# Patient Record
Sex: FEMALE | Race: WHITE | NOT HISPANIC OR LATINO
[De-identification: names, ages, dates, MRNs, and addresses within clinical notes are randomized per-mention and may not be internally consistent; named-entity substitution may affect disease eponyms.]

---

## 2018-08-02 PROBLEM — Z00.00 ENCOUNTER FOR PREVENTIVE HEALTH EXAMINATION: Status: ACTIVE | Noted: 2018-08-02

## 2019-07-14 ENCOUNTER — TRANSCRIPTION ENCOUNTER (OUTPATIENT)
Age: 34
End: 2019-07-14

## 2019-11-30 ENCOUNTER — TRANSCRIPTION ENCOUNTER (OUTPATIENT)
Age: 34
End: 2019-11-30

## 2020-07-22 ENCOUNTER — TRANSCRIPTION ENCOUNTER (OUTPATIENT)
Age: 35
End: 2020-07-22

## 2021-09-11 ENCOUNTER — TRANSCRIPTION ENCOUNTER (OUTPATIENT)
Age: 36
End: 2021-09-11

## 2021-09-15 ENCOUNTER — EMERGENCY (EMERGENCY)
Facility: HOSPITAL | Age: 36
LOS: 0 days | Discharge: HOME | End: 2021-09-15
Payer: MEDICAID

## 2021-09-15 VITALS
RESPIRATION RATE: 20 BRPM | DIASTOLIC BLOOD PRESSURE: 70 MMHG | HEART RATE: 76 BPM | OXYGEN SATURATION: 99 % | TEMPERATURE: 98 F | SYSTOLIC BLOOD PRESSURE: 125 MMHG

## 2021-09-15 VITALS
HEART RATE: 86 BPM | TEMPERATURE: 98 F | OXYGEN SATURATION: 100 % | DIASTOLIC BLOOD PRESSURE: 67 MMHG | WEIGHT: 154.1 LBS | SYSTOLIC BLOOD PRESSURE: 109 MMHG | RESPIRATION RATE: 17 BRPM | HEIGHT: 61 IN
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
## 2021-09-15 DIAGNOSIS — S52.501A UNSPECIFIED FRACTURE OF THE LOWER END OF RIGHT RADIUS, INITIAL ENCOUNTER FOR CLOSED FRACTURE: ICD-10-CM

## 2021-09-15 DIAGNOSIS — S69.91XA UNSPECIFIED INJURY OF RIGHT WRIST, HAND AND FINGER(S), INITIAL ENCOUNTER: ICD-10-CM

## 2021-09-15 PROCEDURE — 99284 EMERGENCY DEPT VISIT MOD MDM: CPT

## 2021-09-15 PROCEDURE — 73130 X-RAY EXAM OF HAND: CPT | Mod: 26,RT

## 2021-09-15 PROCEDURE — 73090 X-RAY EXAM OF FOREARM: CPT | Mod: 26,RT

## 2021-09-15 PROCEDURE — 73110 X-RAY EXAM OF WRIST: CPT | Mod: 26,RT

## 2021-09-15 RX ORDER — KETOROLAC TROMETHAMINE 30 MG/ML
30 SYRINGE (ML) INJECTION ONCE
Refills: 0 | Status: DISCONTINUED | OUTPATIENT
Start: 2021-09-15 | End: 2021-09-15

## 2021-09-15 RX ORDER — OXYCODONE AND ACETAMINOPHEN 5; 325 MG/1; MG/1
1 TABLET ORAL ONCE
Refills: 0 | Status: DISCONTINUED | OUTPATIENT
Start: 2021-09-15 | End: 2021-09-15

## 2021-09-15 RX ADMIN — Medication 30 MILLIGRAM(S): at 20:47

## 2021-09-15 RX ADMIN — OXYCODONE AND ACETAMINOPHEN 1 TABLET(S): 5; 325 TABLET ORAL at 22:12

## 2021-09-15 RX ADMIN — Medication 30 MILLIGRAM(S): at 21:17

## 2021-09-15 RX ADMIN — OXYCODONE AND ACETAMINOPHEN 1 TABLET(S): 5; 325 TABLET ORAL at 22:13

## 2021-09-15 NOTE — CONSULT NOTE ADULT - SUBJECTIVE AND OBJECTIVE BOX
35yFemale presents to ED c/o severe R wrist pain s/p mechanical fall. Patient denies head hit or LOC. Localizing pain to wrist. Denies radiation of pain. Pt denies numbness, tingling or burning. R Hand Dominant. Patient denies any other injuries.    PAST MEDICAL & SURGICAL HISTORY:  No pertinent past medical history    No significant past surgical history      Home Medications:    Allergies    No Known Allergies    Intolerances        PE   Gen: NAD, resting comfortably  RUE:  Skin intact  +TTP over DR  Limited ROM of wrist d/t pain  +AIN/PIN/Ulnar/Musc/Median  Radial pulse 2+  Compartments soft and compressible    Secondary Survey  LUE: Skin intact, no bony tenderness or deformity  RLE: Skin intact, no bony tenderness or deformity  LLE: Skin intact, no bony tenderness or deformity  Spine: Skin intact, no bony tenderness or stepoffs    Imaging:  XRay R Wrist: Showing distal radius fracture    Procedure Note:  After verbal consent obtained, 10 cc of 1% Lidocaine injected into area around fracture site as hematoma block. Sugartong splint applied to Forearm/Wrist and mold held. Post-reduction Xrays obtained which show improved alignment of DR Fracture. Pt NVI post procedure. Pt tolerated procedure well.    A/P: 35yFemale s/p Mech Fall w/ R Distal Radius Fracture  - Pain control  - Strict Ice/Elevation  - NWB RUE with splint and sling  - Keep splint clean/dry/intact;  - Encourage active finger motion to help with swelling  - Pt aware of possible need for surgical intervention of distal radius fracture. Will FU as outpatient  - Pt made aware of signs and symptoms of compartment syndrome. Aware of need to contact Doctor / Return to ED if symtoms arise  - All questions answered, Pt/family understand plan.  - FU w/ Dr. Martinez in 2-3 days.

## 2021-09-15 NOTE — ED PROVIDER NOTE - CARE PROVIDER_API CALL
Lamin Martinez (DO)  Orthopaedic Surgery  [address] [place] [address]  Phone: [phone]  Fax: [phone]  Follow Up Time:

## 2021-09-15 NOTE — ED ADULT NURSE NOTE - OBJECTIVE STATEMENT
Pt alert and oriented to ed with c/o right arm pain, rom urgent care s/p close fracture of the right wrist .Sling on.

## 2021-09-15 NOTE — ED PROVIDER NOTE - PATIENT PORTAL LINK FT
You can access the FollowMyHealth Patient Portal offered by [facility] by registering at the following website: [email or web address]. By joining [facility]’s FollowMyHealth portal, you will also be able to view your health information using other applications (apps) compatible with our system.

## 2021-09-15 NOTE — ED PROVIDER NOTE - CLINICAL SUMMARY MEDICAL DECISION MAKING FREE TEXT BOX
distal radius fracture, neurovascularly intact, recommend repeat xrays, analgesia, orthopedics consult

## 2021-09-15 NOTE — ED ADULT NURSE NOTE - PRO INTERPRETER NEED 2
English 34 year old female P1 at 40.6 weeks in labor    GBS negative  EFW 3300g    case d/w Dr Dias   admitted in labor   epidural prn   covid 19 testing   Yaya BRANNON

## 2021-09-15 NOTE — ED PROVIDER NOTE - OBJECTIVE STATEMENT
35F here with right wrist injury. Sent from [place] [facility] with fracture. Reports this morning she was sparring with her , fell to the ground and developed severe wrist pain. No numbness or weakness. No other injury. Denies pregnancy

## 2021-09-20 ENCOUNTER — APPOINTMENT (OUTPATIENT)
Dept: ORTHOPEDIC SURGERY | Facility: CLINIC | Age: 36
End: 2021-09-20
Payer: MEDICAID

## 2021-09-20 DIAGNOSIS — S52.501A UNSPECIFIED FRACTURE OF THE LOWER END OF RIGHT RADIUS, INITIAL ENCOUNTER FOR CLOSED FRACTURE: ICD-10-CM

## 2021-09-20 PROCEDURE — 29075 APPL CST ELBW FNGR SHORT ARM: CPT | Mod: RT

## 2021-09-20 PROCEDURE — 99203 OFFICE O/P NEW LOW 30 MIN: CPT | Mod: 25

## 2021-09-20 PROCEDURE — 73110 X-RAY EXAM OF WRIST: CPT | Mod: RT

## 2021-09-20 RX ORDER — IBUPROFEN 600 MG/1
600 TABLET, FILM COATED ORAL
Qty: 60 | Refills: 0 | Status: ACTIVE | COMMUNITY
Start: 2021-09-20 | End: 1900-01-01

## 2021-09-20 NOTE — ADDENDUM
[FreeTextEntry1] : I, Ninoska Morales wrote this note acting as a scribe for Dr. Guero Jeong on Sep 20, 2021.

## 2021-09-20 NOTE — RETURN TO WORK/SCHOOL
[FreeTextEntry1] : Ms. ERICKSON DO was seen in the office today on 09/20/2021 and evaluated by me for an Orthopedic visit. Please be advised that she will return to work on Monday 09/27/21.\par \par Sincerely,\par \par Dr. Guero Argueta M.D. on 09/20/2021.

## 2021-09-20 NOTE — END OF VISIT
[FreeTextEntry3] : All medical record entries made by the Scribe were at my,  Dr. Guero Jeong MD., direction and personally dictated by me on 09/20/2021. I have personally reviewed the chart and agree that the record accurately reflects my personal performance of the history, physical exam, assessment and plan.

## 2021-09-20 NOTE — DISCUSSION/SUMMARY
[de-identified] : The underlying pathophysiology was reviewed with the patient. XR films were reviewed with the patient. Discussed at length the nature of the patient’s condition. The right wrist symptoms appear secondary to comminuted fracture the distal radial metaphysis\par \par After review of the xrays, the fracture is in good position. I am not recommending surgical intervention at this time. I did however note to the patient that there is always a chance that the fracture can displace. She has agreed to proceed with the treatment plan and with casting of the right wrist.\par A right short arm cast was applied.\par She was advised that she will be casted for a total of 6 weeks.\par Proper cast care instructions were reviewed.\par She may utilize the upper extremity for all ADLs as tolerated.\par She was instructed on ROM exercises of the shoulder, elbow, hand and wrist.\par Rx: Ibuprofen 600mg - 30 tablets, BID\par NSAIDs prn.\par \par Patient can continue activities as tolerated. All questions answered, understanding verbalized. Patient in agreement with plan of care. Follow up in  10-14 days for repeat xrays.

## 2021-09-20 NOTE — PHYSICAL EXAM
[de-identified] : Patient is WDWN, alert, and in no acute distress. Breathing is unlabored. She is grossly oriented to person, place, and time.\par \par She presents in a sling and splint today.\par \par Right Wrist:\par Inspection/Palpation: Bruising and discoloration present dorsally and radially to the wrist. \par Edema present\par Pain with rotation of the wrist. All other planes of motion were not accessed due to injury and pain.\par FROM of the digits.\par Intermittent numbness and tingling to the digits.  [de-identified] : 9/20/21 - AP, lateral and oblique views of the right wrist were obtained today and revealed a well alligned fracture of the distal radius. Fracture is in good position on all 3 views. 		 \par \par \par EXAM: XR FOREARM 2 VIEWS RT - 9/15/21\par IMPRESSION: \par Acute comminuted minimally displaced fracture distal radial metaphysis. No other fractures. No dislocation. Joint spaces preserved. Soft tissue swelling.\par \par MAXX OSMAN MD; Attending Radiologist\par This document has been electronically signed. Sep 16 2021 10:37AM\par \par \par EXAM: XR WRIST COMP MIN 3 VIEWS RT - 9/15/21\par IMPRESSION: \par Overlying cast obscures fine osseous detail. Comminuted fracture the distal radial metaphysis with satisfactory gross alignment.\par \par MAXX OSMAN MD; Attending Radiologist\par This document has been electronically signed. Sep 16 2021 10:36AM
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
## 2021-09-30 ENCOUNTER — APPOINTMENT (OUTPATIENT)
Dept: ORTHOPEDIC SURGERY | Facility: CLINIC | Age: 36
End: 2021-09-30
Payer: MEDICAID

## 2021-09-30 VITALS — BODY MASS INDEX: 29.07 KG/M2 | HEIGHT: 61 IN | WEIGHT: 154 LBS

## 2021-09-30 PROBLEM — Z78.9 OTHER SPECIFIED HEALTH STATUS: Chronic | Status: ACTIVE | Noted: 2021-09-15

## 2021-09-30 PROCEDURE — 29075 APPL CST ELBW FNGR SHORT ARM: CPT | Mod: RT

## 2021-09-30 PROCEDURE — 99213 OFFICE O/P EST LOW 20 MIN: CPT | Mod: 25

## 2021-09-30 PROCEDURE — 73110 X-RAY EXAM OF WRIST: CPT | Mod: RT

## 2021-09-30 NOTE — DISCUSSION/SUMMARY
[de-identified] : The underlying pathophysiology was reviewed with the patient. XR films were reviewed with the patient. Discussed at length the nature of the patient’s condition. The right wrist symptoms appear secondary to comminuted fracture the distal radial metaphysis.\par \par Xrays from the last visit were compared with new xrays obtained today on 9/30/21. As the fracture has remained in good alignment, I am recommending she continue nonoperatively. She is in agreement.\par The right short arm cast was removed and replaced on 9/30/21.\par Proper cast care instructions were once again reviewed.\par She may utilize the upper extremity for all ADLs as tolerated.\par She was instructed on ROM exercises of the shoulder, elbow, hand and wrist.\par She was advised to take Calcium Citrate, Vitamin D3 and Vitamin C. \par \par Patient can continue activities as tolerated. All questions answered, understanding verbalized. Patient in agreement with plan of care. Follow up in 10-14 days for repeat xrays.
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
## 2021-09-30 NOTE — PHYSICAL EXAM
[de-identified] : Patient is WDWN, alert, and in no acute distress. Breathing is unlabored. She is grossly oriented to person, place, and time.\par \par Right Wrist:\par Patient presents in a right short arm cast, which was removed and replaced (9/30/21).\par Continued tenderness over the distal radius dorsally\par FROM of the digits\par Sensation is normal [de-identified] : AP, lateral and oblique views of the right wrist were obtained today and revealed a well aligned fracture of the distal radius. Fracture is in good position on all 3 views.

## 2021-09-30 NOTE — END OF VISIT
[FreeTextEntry3] : All medical record entries made by the Scribe were at my,  Dr. Guero Jeong MD., direction and personally dictated by me on 09/30/2021. I have personally reviewed the chart and agree that the record accurately reflects my personal performance of the history, physical exam, assessment and plan.

## 2021-10-14 ENCOUNTER — APPOINTMENT (OUTPATIENT)
Dept: ORTHOPEDIC SURGERY | Facility: CLINIC | Age: 36
End: 2021-10-14
Payer: MEDICAID

## 2021-10-14 VITALS — HEIGHT: 61 IN | WEIGHT: 150 LBS | BODY MASS INDEX: 28.32 KG/M2

## 2021-10-14 PROCEDURE — 73110 X-RAY EXAM OF WRIST: CPT | Mod: RT

## 2021-10-14 PROCEDURE — 99213 OFFICE O/P EST LOW 20 MIN: CPT | Mod: 25

## 2021-10-14 PROCEDURE — 29075 APPL CST ELBW FNGR SHORT ARM: CPT | Mod: RT

## 2021-10-14 NOTE — ADDENDUM
[FreeTextEntry1] : I, Ninoska Morales wrote this note acting as a scribe for Dr. Guero Jeong on Oct 14, 2021.

## 2021-10-14 NOTE — PHYSICAL EXAM
[de-identified] : Patient is WDWN, alert, and in no acute distress. Breathing is unlabored. She is grossly oriented to person, place, and time.\par \par Right Wrist:\par Cast was removed and replaced on 10/14/21.\par ROM not accessed while cast was removed.\par Digits moving freely\par Sensation is normal to finger tips. \par  [de-identified] : AP, lateral and oblique views of the right wrist were obtained today and revealed a well aligned fracture of the distal radius. Fracture is in good position on all 3 views. Facture is line is still evident

## 2021-10-14 NOTE — END OF VISIT
[FreeTextEntry3] : All medical record entries made by the Scribe were at my,  Dr. Guero Jeong MD., direction and personally dictated by me on 10/14/2021. I have personally reviewed the chart and agree that the record accurately reflects my personal performance of the history, physical exam, assessment and plan.
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
## 2021-10-28 ENCOUNTER — APPOINTMENT (OUTPATIENT)
Dept: ORTHOPEDIC SURGERY | Facility: CLINIC | Age: 36
End: 2021-10-28
Payer: MEDICAID

## 2021-10-28 PROCEDURE — 29125 APPL SHORT ARM SPLINT STATIC: CPT | Mod: RT

## 2021-10-28 PROCEDURE — 99213 OFFICE O/P EST LOW 20 MIN: CPT | Mod: 25

## 2021-10-28 PROCEDURE — 73110 X-RAY EXAM OF WRIST: CPT | Mod: RT
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
## 2021-10-28 NOTE — PHYSICAL EXAM
[de-identified] : Patient is WDWN, alert, and in no acute distress. Breathing is unlabored. She is grossly oriented to person, place, and time.\par \par Right Wrist:\par Cast was removed on 10/28/21\par Tenderness to palpation over the ulnar aspect of the wrist\par Edema noted volarly through the wrist\par ROM limited\par Digits moving freely\par Sensation is normal to finger tips.  [de-identified] : AP, lateral and oblique views of the right wrist were obtained today and revealed a well aligned fracture of the distal radius. Fracture is in good position on all 3 views. Facture is line is still evident. Fracture is still not yet fully healed.

## 2021-10-28 NOTE — DISCUSSION/SUMMARY
[FreeTextEntry1] : The underlying pathophysiology was reviewed with the patient. XR films were reviewed with the patient. Discussed at length the nature of the patient’s condition. The right wrist symptoms appear secondary to comminuted fracture the distal radial metaphysis.\par \par At this time, repeat xrays were obtained and she was advised that the fracture is still healing. As such, I recommended that she continue with usage of a brace or splint for protection, especially with heavy work.\par She was placed into a well molded removable splint today for protection.\par She was advise of activity modification and to avoid WB activity as well as to use caution as to not fall and risk re-fracturing the wrist. \par As she is fearful to move the wrist due to pain and stiffness, I am recommending she go for hand therapy.\par The patient wishes to proceed with hand therapy of the right wrist. A script was given.		 \par \par Patient can continue activities as tolerated. All questions answered, understanding verbalized. Patient in agreement with plan of care. Follow up in 4 weeks for repeat xrays.

## 2021-10-28 NOTE — ADDENDUM
[FreeTextEntry1] : I, Ninoska Morales wrote this note acting as a scribe for Dr. Guero Jeong on Oct 28, 2021.

## 2021-10-28 NOTE — END OF VISIT
[FreeTextEntry3] : All medical record entries made by the Scribe were at my,  Dr. Guero Jeong MD., direction and personally dictated by me on 10/28/2021. I have personally reviewed the chart and agree that the record accurately reflects my personal performance of the history, physical exam, assessment and plan.

## 2021-12-02 ENCOUNTER — APPOINTMENT (OUTPATIENT)
Dept: ORTHOPEDIC SURGERY | Facility: CLINIC | Age: 36
End: 2021-12-02

## 2021-12-16 ENCOUNTER — APPOINTMENT (OUTPATIENT)
Dept: ORTHOPEDIC SURGERY | Facility: CLINIC | Age: 36
End: 2021-12-16
Payer: MEDICAID

## 2021-12-16 DIAGNOSIS — S52.501D UNSPECIFIED FRACTURE OF THE LOWER END OF RIGHT RADIUS, SUBSEQUENT ENCOUNTER FOR CLOSED FRACTURE WITH ROUTINE HEALING: ICD-10-CM

## 2021-12-16 PROCEDURE — 73110 X-RAY EXAM OF WRIST: CPT | Mod: RT

## 2021-12-16 PROCEDURE — 99213 OFFICE O/P EST LOW 20 MIN: CPT
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
## 2021-12-16 NOTE — END OF VISIT
[FreeTextEntry3] : All medical record entries made by the Scribe were at my,  Dr. Guero Jeong MD., direction and personally dictated by me on 12/16/2021. I have personally reviewed the chart and agree that the record accurately reflects my personal performance of the history, physical exam, assessment and plan.

## 2021-12-16 NOTE — ADDENDUM
[FreeTextEntry1] : I, Ninoska Morales wrote this note acting as a scribe for Dr. Guero Jeong on Dec 16, 2021.

## 2021-12-16 NOTE — DISCUSSION/SUMMARY
[FreeTextEntry1] : The underlying pathophysiology was reviewed with the patient. XR films were reviewed with the patient. Discussed at length the nature of the patient’s condition. The right wrist symptoms appear secondary to comminuted fracture the distal radial metaphysis.\par \par She was reassured that the fracture is well aligned and healed. I told her that it is normal to have pain to the wrist for up to 1 year post injury. We discussed that she will continue to show signs of improvement and I would recommend that she continue with physical therapy and acupuncture.\par Furthermore, given the nature of her pain and referred symptoms to the ulnar side of her pain, we did discuss further imaging of the wrist with an MRI. She has deferred the prescription as I did tell her it would not change the course of treatment given the ulna was not fractured. If she changes her mind, she will call the office for a prescription.\par \par All questions answered, understanding verbalized. Patient in agreement with plan of care. Follow up in 3 months, according to her symptoms.

## 2021-12-16 NOTE — PHYSICAL EXAM
[de-identified] : Patient is WDWN, alert, and in no acute distress. Breathing is unlabored. She is grossly oriented to person, place, and time.\par \par Right Wrist:\par Cast removed on 10/28/21.\par Tenderness to palpation ulnarly along the wrist\par Resolved edema noted volarly throughout the wrist.\par FROM of the digits\par Sensation to the digits is normal [de-identified] : AP, lateral and oblique views of the right wrist were obtained today and revealed a well aligned fracture of the distal radius. Fracture is in good position on all 3 views. Fracture is fully healed on all 3 views.

## 2021-12-21 ENCOUNTER — TRANSCRIPTION ENCOUNTER (OUTPATIENT)
Age: 36
End: 2021-12-21

## 2022-12-21 NOTE — DISCUSSION/SUMMARY
[de-identified] : The underlying pathophysiology was reviewed with the patient. XR films were reviewed with the patient. Discussed at length the nature of the patient’s condition. The right wrist symptoms appear secondary to comminuted fracture the distal radial metaphysis.\par \par After review of xrays obtained on 10/14/21, the fracture has remained in good position. I am recommending she continue in the cast for another 2 weeks.\par She may continue with all ADLs as tolerated while casted.\par \par All questions answered, understanding verbalized. Patient in agreement with plan of care. Follow up in 2 weeks for repeat xrays and cast removal. Yes

## 2023-06-24 ENCOUNTER — NON-APPOINTMENT (OUTPATIENT)
Age: 38
End: 2023-06-24

## 2024-01-06 ENCOUNTER — NON-APPOINTMENT (OUTPATIENT)
Age: 39
End: 2024-01-06

## 2024-02-14 ENCOUNTER — APPOINTMENT (OUTPATIENT)
Dept: ORTHOPEDIC SURGERY | Facility: CLINIC | Age: 39
End: 2024-02-14
Payer: MEDICAID

## 2024-02-14 DIAGNOSIS — S52.501S UNSPECIFIED FRACTURE OF THE LOWER END OF RIGHT RADIUS, SEQUELA: ICD-10-CM

## 2024-02-14 PROCEDURE — 73110 X-RAY EXAM OF WRIST: CPT | Mod: RT

## 2024-02-14 PROCEDURE — 99203 OFFICE O/P NEW LOW 30 MIN: CPT

## 2024-02-14 NOTE — IMAGING
[de-identified] : RIGHT HAND prominent ulnar head. skin intact. TTP to dorsal distal radius, ulnar head, volar wrist. wrist ROM: good extension, flexion. good pronation, supination. good EPL, FPL. good finger extension, flex to full fist. good finger abduction and adduction.   5/5. 1st DI 5/5. APB 4/5. SILT to median, ulnar, radial distribution.  palpable radial pulse, brisk cap refill all digits. no triggering. negative Tinel's at wrist. negative Tinel's at cubital tunnel.  no pain with wrist flexion, extension. no pain with pronation, supination. no pain with radial and ulnar deviation. Robles's maneuver => no pain. no instability. DRUJ shear => + pain @supination. no instability. negative ECU synergy test.   XRAYS OF RIGHT WRIST: no acute displaced fracture or dislocation. ulnar positive variance.

## 2024-02-14 NOTE — ASSESSMENT
[FreeTextEntry1] : The condition was explained to the patient. X-rays of R wrist show healed distal radius fracture, but ulnar positive variance. Symptoms likely due to increased ulnar loading/abutment, which can cause persistent pain, stiffness, TFCC injury, post-traumatic arthritis.  Reviewed risks and benefits of treatment options - activity modification, NSAID, wrist brace, steroid injection for temporary pain relief, or surgery (ulnar shortening osteotomy). She would like to continue conservative treatment at this time.  Also discussed activity/posture modification and night splint for CuTS.  F/u PRN.

## 2024-02-14 NOTE — HISTORY OF PRESENT ILLNESS
[Dull/Aching] : dull/aching [Frequent] : frequent [Leisure] : leisure [de-identified] : 2/14/24: 39yo RHD female (estevan) presents for RIGHT wrist pain since 2021. She sustained a RIGHT distal radius fx from a fall on 9/15/21, for which she was seeing Dr. Guero Jeong => short arm cast, therapy x 6-8 weeks. There was no recent injury.  Pain is intermittent. She has difficulty lifting heavy objects, doing push ups, prolonged mixing for cooking. Also c/o grinding with wrist motion. Also c/o deformity of wrist. Also c/o intermittent tingling of ring/small fingers. Not currently taking any medication for this.  Hx: none. [] : no [FreeTextEntry1] : RIGHT wrist  [FreeTextEntry5] : ERICKSON fam [RHD] 38 year old female is here today c/o RIGHT wrist pain since 09/15/21 after falling on wrist while boxing. treated non operatively (casted), attended therapy. c/o increased pain at ulnar side of wrist with prolonged activity as well as grinding of bones with twisting wrist. denies prior injury to wrist.  [de-identified] : 09/20/21 [de-identified] : outside provider
